# Patient Record
Sex: MALE | Race: WHITE | NOT HISPANIC OR LATINO | Employment: FULL TIME | URBAN - METROPOLITAN AREA
[De-identification: names, ages, dates, MRNs, and addresses within clinical notes are randomized per-mention and may not be internally consistent; named-entity substitution may affect disease eponyms.]

---

## 2020-11-14 ENCOUNTER — NURSE TRIAGE (OUTPATIENT)
Dept: OTHER | Facility: OTHER | Age: 40
End: 2020-11-14

## 2020-11-14 DIAGNOSIS — Z20.822 EXPOSURE TO COVID-19 VIRUS: Primary | ICD-10-CM

## 2020-11-19 DIAGNOSIS — Z20.822 EXPOSURE TO COVID-19 VIRUS: ICD-10-CM

## 2020-11-19 PROCEDURE — U0003 INFECTIOUS AGENT DETECTION BY NUCLEIC ACID (DNA OR RNA); SEVERE ACUTE RESPIRATORY SYNDROME CORONAVIRUS 2 (SARS-COV-2) (CORONAVIRUS DISEASE [COVID-19]), AMPLIFIED PROBE TECHNIQUE, MAKING USE OF HIGH THROUGHPUT TECHNOLOGIES AS DESCRIBED BY CMS-2020-01-R: HCPCS | Performed by: FAMILY MEDICINE

## 2020-11-21 ENCOUNTER — TELEPHONE (OUTPATIENT)
Dept: UROLOGY | Facility: CLINIC | Age: 40
End: 2020-11-21

## 2020-11-21 LAB — SARS-COV-2 RNA SPEC QL NAA+PROBE: DETECTED

## 2022-09-29 ENCOUNTER — CONSULT (OUTPATIENT)
Dept: UROLOGY | Facility: CLINIC | Age: 42
End: 2022-09-29
Payer: COMMERCIAL

## 2022-09-29 VITALS
WEIGHT: 189.2 LBS | HEIGHT: 71 IN | SYSTOLIC BLOOD PRESSURE: 140 MMHG | DIASTOLIC BLOOD PRESSURE: 80 MMHG | HEART RATE: 109 BPM | OXYGEN SATURATION: 100 % | BODY MASS INDEX: 26.49 KG/M2

## 2022-09-29 DIAGNOSIS — Z30.2 ENCOUNTER FOR STERILIZATION: Primary | ICD-10-CM

## 2022-09-29 PROCEDURE — 99203 OFFICE O/P NEW LOW 30 MIN: CPT | Performed by: PHYSICIAN ASSISTANT

## 2022-09-29 RX ORDER — DIAZEPAM 5 MG/1
TABLET ORAL
Qty: 2 TABLET | Refills: 0 | Status: SHIPPED | OUTPATIENT
Start: 2022-09-29

## 2022-09-29 NOTE — PROGRESS NOTES
Referring Physician: Geronimo Shone, CRNP  A copy of this consultation note was communicated to the referring physician  Diagnoses and all orders for this visit:    Encounter for sterilization  -     Vasectomy; Future  -     diazepam (VALIUM) 5 mg tablet; Take both tablets 1 hour prior to the procedure            Assessment and plan:       We had a long discussion regarding the options for birth control  I told the patient that vasectomy is considered to be a permanent surgical sterilization procedure  We spoke about other options including the possibility of vasectomy reversal at a later time  He understands that vasectomy confers no immunity to STDs  I also told him that according to our present knowledge, there is no causal relationship between vasectomy and subsequent development of prostate cancer or testicular cancer  No change in libido erection or ejaculation  We spoke about the potential complications  The most common one in the short term is scrotal hematoma and infection, which rarely requires re-operation  Additionally, he can react to the anesthetic, develop scrotal swelling, have pain or skin bruising  We spoke about post procedure care to try to minimize this complication  I also asked him to refrain from aspirin or fish oil products and alcohol prior to the procedure  The long-term complications include but are not limited to vasectomy failure by recanalization, chronic epididymal discomfort, pain, among other possibilities  I described to him how this procedure is normally performed in an office setting and he understands that if anesthesia is desired, this can be performed for him in an outpatient operative setting  Finally, he understands that following vasectomy, hell need to use other means of birth control until hes had semen analyses that demonstrate the absence of sperm    He understands it will be his responsibility to submit these semen specimens and call our office for the results  I told him again that recanalization is a small but real possibility, and if he is ever concerned about it he can submit another semen specimen for analysis  After discussing the risks, benefits, possible complications and alternatives, informed consents were obtained  Diazepam was prescribed, and review dosing, adverse effects and timing of the procedure  He will return in the near future for the procedure  Chief Complaint     Desire for vasectomy      History of Present Illness     Kiera Phillips is a 43 y o  male referred for evaluation of vasectomy  He has 3 biological children  He states that he and his partner have come to the mutual decision they do not desire any additional children  He has no prior past medical, past surgical, or past urologic history    Detailed Urologic History     - please refer to HPI    Review of Systems     Review of Systems   Constitutional: Negative for activity change and fatigue  HENT: Negative for congestion  Eyes: Negative for visual disturbance  Respiratory: Negative for shortness of breath and wheezing  Cardiovascular: Negative for chest pain and leg swelling  Gastrointestinal: Negative for abdominal pain  Endocrine: Negative for polyuria  Genitourinary: Negative for dysuria, flank pain, hematuria and urgency  Musculoskeletal: Negative for back pain  Allergic/Immunologic: Negative for immunocompromised state  Neurological: Negative for dizziness and numbness  Psychiatric/Behavioral: Negative for dysphoric mood  All other systems reviewed and are negative  Allergies     No Known Allergies    Physical Exam     Physical Exam   Constitutional: He is oriented to person, place, and time  He appears well-developed and well-nourished  No distress  HENT:   Head: Normocephalic and atraumatic  Eyes: EOM are normal    Neck: Normal range of motion     Cardiovascular:   Negative lower extremity edema Pulmonary/Chest: Effort normal and breath sounds normal    Abdominal: Soft  Genitourinary:   Genitourinary Comments: Vas deferens are palpable bilaterally  Musculoskeletal: Normal range of motion  Neurological: He is alert and oriented to person, place, and time  Skin: Skin is warm  Psychiatric: He has a normal mood and affect  His behavior is normal          Vital Signs  Vitals:    09/29/22 0919   BP: 140/80   BP Location: Left arm   Patient Position: Sitting   Cuff Size: Standard   Pulse: (!) 109   SpO2: 100%   Weight: 85 8 kg (189 lb 3 2 oz)   Height: 5' 11" (1 803 m)         Current Medications       Current Outpatient Medications:     diazepam (VALIUM) 5 mg tablet, Take both tablets 1 hour prior to the procedure, Disp: 2 tablet, Rfl: 0    fluticasone-salmeterol (ADVAIR) 100-50 mcg/dose, Inhale 1 puff as needed  , Disp: , Rfl:     Multiple Vitamin (MULTIVITAMIN) tablet, Take 1 tablet by mouth daily  , Disp: , Rfl:       Active Problems     Patient Active Problem List   Diagnosis    Deviated nasal septum    Chronic maxillary sinusitis         Past Medical History     History reviewed  No pertinent past medical history  Surgical History     Past Surgical History:   Procedure Laterality Date    WI EXCISION TURBINATE,SUBMUCOUS Bilateral 6/13/2016    Procedure: Luciano Doherty;  Surgeon: Casey Starks MD;  Location: [facility];  Service: ENT    WI NASAL/SINUS ENDOSCOPY,RMV YESSENIA BULL Right 6/13/2016    Procedure: FUNCTIONAL ENDOSCOPIC SINUS SURGERY (FESS); Surgeon: Casey Starks MD;  Location: [facility];  Service: ENT    WI REPAIR OF NASAL SEPTUM N/A 6/13/2016    Procedure: SEPTOPLASTY;  Surgeon: Casey Starks MD;  Location: [facility];  Service: ENT         Family History     History reviewed  No pertinent family history        Social History     Social History     Social History     Tobacco Use   Smoking Status Not on file   Smokeless Tobacco Not on file       Portions of the record may have been created with voice recognition software  Occasional wrong word or "sound a like" substitutions may have occurred due to the inherent limitations of voice recognition software  Read the chart carefully and recognize, using context, where substitutions have occurred

## 2022-12-22 ENCOUNTER — PROCEDURE VISIT (OUTPATIENT)
Dept: UROLOGY | Facility: CLINIC | Age: 42
End: 2022-12-22

## 2022-12-22 VITALS
SYSTOLIC BLOOD PRESSURE: 122 MMHG | DIASTOLIC BLOOD PRESSURE: 80 MMHG | WEIGHT: 198 LBS | BODY MASS INDEX: 27.72 KG/M2 | HEIGHT: 71 IN | OXYGEN SATURATION: 99 % | HEART RATE: 85 BPM

## 2022-12-22 DIAGNOSIS — Z30.2 ENCOUNTER FOR STERILIZATION: Primary | ICD-10-CM

## 2022-12-22 RX ORDER — ALBUTEROL SULFATE 90 UG/1
AEROSOL, METERED RESPIRATORY (INHALATION)
COMMUNITY

## 2022-12-22 NOTE — PROGRESS NOTES
Procedures      No Scalpel Vasectomy Procedure Note    Indications: 43 y o   y o  male desiring permanent sterilization    Pre-operative Diagnosis: Undesired fertility    Post-operative Diagnosis: Undesired fertility    Anesthesia: Lidocaine 2% without epinephrine      Procedure Details     The risks and benefits of the procedure were discussed at the pre-procedure consultation, and written, informed consent obtained  Premedicated with Valium 10 mgm po 60 minutes prior to procedure  The scrotum was palpated with both testes normal in size and position, no masses palpitated  The scrotum was cleansed with warm Betadine and draped in the usual sterile manner  A vasal sheath block was performed on both the left and right vas  After adequate anesthesia was established, a small perforation was made in the skin and the left vas was isolated with the ring forceps, dissected free and delivered through the skin perforation  The left vas was divided, approximately 1 5 cm portion removed, and each end of the vas was cauterized and suture ligated  The ends of the vas were replaced in the scrotum through the puncture site  The right vas was then isolated, divided, cauterized and ligated in a similar fashion  Midportions removed were sent to pathology to confirm  Any bleeding was controlled with electrocautery  The puncture site was dry when the procedure was completed  After discussion with the patient, the puncture site was sutured using single 5-0 chromic suture in figure 8 fashion  Specimen:   1  Right vas deferens  2  Left vas deferens    Condition: Stable    Complications: none    Plan:        He did very well with the procedure today  Postprocedural instructions were provided, including instructions, scheduling information, and the specimen cup for his postprocedural hemodialysis    He was instructed to perform this at 6 weeks and 2 call my office after the specimen is submitted to review the results by phone  He was instructed to continue his current methods of contraception until that time

## 2023-02-03 ENCOUNTER — APPOINTMENT (OUTPATIENT)
Dept: LAB | Facility: CLINIC | Age: 43
End: 2023-02-03

## 2023-02-03 ENCOUNTER — TELEPHONE (OUTPATIENT)
Dept: OTHER | Facility: OTHER | Age: 43
End: 2023-02-03

## 2023-02-03 DIAGNOSIS — Z30.2 ENCOUNTER FOR STERILIZATION: ICD-10-CM

## 2023-02-03 LAB
DEPRECATED CD4 CELLS/CD8 CELLS BLD: 4 ML
SPERM MOTILE SMN QL MICRO: ABNORMAL

## 2023-02-03 NOTE — TELEPHONE ENCOUNTER
Per communication consent okay to leave voicemail on cell, message left advising pt of kims message

## 2023-02-03 NOTE — TELEPHONE ENCOUNTER
Patient called in stating that he just took a test this morning and doesn't understand the results and would like a call back from a nurse to go over them with him